# Patient Record
Sex: FEMALE | Race: WHITE | NOT HISPANIC OR LATINO | Employment: OTHER | URBAN - METROPOLITAN AREA
[De-identification: names, ages, dates, MRNs, and addresses within clinical notes are randomized per-mention and may not be internally consistent; named-entity substitution may affect disease eponyms.]

---

## 2023-04-12 ENCOUNTER — OFFICE VISIT (OUTPATIENT)
Dept: PRIMARY CARE | Facility: CLINIC | Age: 88
End: 2023-04-12
Payer: MEDICARE

## 2023-04-12 VITALS
DIASTOLIC BLOOD PRESSURE: 76 MMHG | HEART RATE: 80 BPM | TEMPERATURE: 97.5 F | SYSTOLIC BLOOD PRESSURE: 128 MMHG | BODY MASS INDEX: 25.13 KG/M2 | RESPIRATION RATE: 22 BRPM | WEIGHT: 128 LBS | HEIGHT: 60 IN

## 2023-04-12 DIAGNOSIS — N18.31 STAGE 3A CHRONIC KIDNEY DISEASE (MULTI): ICD-10-CM

## 2023-04-12 DIAGNOSIS — F32.0 DEPRESSION, MAJOR, SINGLE EPISODE, MILD (CMS-HCC): ICD-10-CM

## 2023-04-12 DIAGNOSIS — Z00.00 ROUTINE GENERAL MEDICAL EXAMINATION AT A HEALTH CARE FACILITY: ICD-10-CM

## 2023-04-12 DIAGNOSIS — L89.152 PRESSURE INJURY OF SACRAL REGION, STAGE 2 (MULTI): ICD-10-CM

## 2023-04-12 DIAGNOSIS — G47.00 INSOMNIA, UNSPECIFIED TYPE: ICD-10-CM

## 2023-04-12 DIAGNOSIS — G62.89 OTHER POLYNEUROPATHY: Primary | ICD-10-CM

## 2023-04-12 DIAGNOSIS — C50.919 MALIGNANT NEOPLASM OF FEMALE BREAST, UNSPECIFIED ESTROGEN RECEPTOR STATUS, UNSPECIFIED LATERALITY, UNSPECIFIED SITE OF BREAST (MULTI): ICD-10-CM

## 2023-04-12 DIAGNOSIS — J44.9 CHRONIC OBSTRUCTIVE PULMONARY DISEASE, UNSPECIFIED COPD TYPE (MULTI): ICD-10-CM

## 2023-04-12 PROBLEM — C44.91 BASAL CELL CARCINOMA: Status: ACTIVE | Noted: 2023-04-12

## 2023-04-12 PROBLEM — E78.5 HYPERLIPIDEMIA: Status: ACTIVE | Noted: 2023-04-12

## 2023-04-12 PROBLEM — K40.90 INGUINAL HERNIA: Status: ACTIVE | Noted: 2023-04-12

## 2023-04-12 PROBLEM — J45.909 ASTHMA (HHS-HCC): Status: ACTIVE | Noted: 2023-04-12

## 2023-04-12 PROBLEM — M85.80 OSTEOPENIA: Status: ACTIVE | Noted: 2023-04-12

## 2023-04-12 PROBLEM — D64.9 ANEMIA: Status: ACTIVE | Noted: 2023-04-12

## 2023-04-12 PROBLEM — G62.9 PERIPHERAL NEUROPATHY: Status: ACTIVE | Noted: 2023-04-12

## 2023-04-12 PROBLEM — C43.9 MELANOMA (MULTI): Status: ACTIVE | Noted: 2023-04-12

## 2023-04-12 PROBLEM — I10 HYPERTENSION: Status: ACTIVE | Noted: 2023-04-12

## 2023-04-12 PROBLEM — E55.9 VITAMIN D DEFICIENCY: Status: ACTIVE | Noted: 2023-04-12

## 2023-04-12 PROBLEM — K58.9 IRRITABLE BOWEL SYNDROME: Status: ACTIVE | Noted: 2023-04-12

## 2023-04-12 PROBLEM — E73.9 LACTOSE INTOLERANCE: Status: ACTIVE | Noted: 2023-04-12

## 2023-04-12 PROBLEM — I51.7 CARDIOMEGALY: Status: ACTIVE | Noted: 2023-04-12

## 2023-04-12 PROBLEM — R33.9 URINARY RETENTION: Status: ACTIVE | Noted: 2023-04-12

## 2023-04-12 PROBLEM — G60.9 IDIOPATHIC NEUROPATHY: Status: ACTIVE | Noted: 2023-04-12

## 2023-04-12 PROBLEM — S32.9XXA FRACTURE OF PELVIS (MULTI): Status: ACTIVE | Noted: 2023-04-12

## 2023-04-12 PROCEDURE — 99213 OFFICE O/P EST LOW 20 MIN: CPT | Performed by: FAMILY MEDICINE

## 2023-04-12 PROCEDURE — 1159F MED LIST DOCD IN RCRD: CPT | Performed by: FAMILY MEDICINE

## 2023-04-12 PROCEDURE — 1157F ADVNC CARE PLAN IN RCRD: CPT | Performed by: FAMILY MEDICINE

## 2023-04-12 PROCEDURE — 1036F TOBACCO NON-USER: CPT | Performed by: FAMILY MEDICINE

## 2023-04-12 PROCEDURE — 3074F SYST BP LT 130 MM HG: CPT | Performed by: FAMILY MEDICINE

## 2023-04-12 PROCEDURE — 3078F DIAST BP <80 MM HG: CPT | Performed by: FAMILY MEDICINE

## 2023-04-12 RX ORDER — FUROSEMIDE 20 MG/1
1 TABLET ORAL DAILY
COMMUNITY
Start: 2022-06-13 | End: 2023-10-17 | Stop reason: ALTCHOICE

## 2023-04-12 RX ORDER — DOCUSATE SODIUM 100 MG/1
CAPSULE, LIQUID FILLED ORAL
COMMUNITY
End: 2023-10-17 | Stop reason: ALTCHOICE

## 2023-04-12 RX ORDER — CYANOCOBALAMIN (VITAMIN B-12) 500 MCG
TABLET ORAL
COMMUNITY
End: 2023-10-17 | Stop reason: ALTCHOICE

## 2023-04-12 RX ORDER — ALBUTEROL SULFATE 90 UG/1
2 AEROSOL, METERED RESPIRATORY (INHALATION) EVERY 4 HOURS PRN
COMMUNITY
Start: 2021-03-22

## 2023-04-12 RX ORDER — IPRATROPIUM BROMIDE AND ALBUTEROL SULFATE 2.5; .5 MG/3ML; MG/3ML
3 SOLUTION RESPIRATORY (INHALATION)
COMMUNITY
Start: 2022-02-11 | End: 2023-10-17 | Stop reason: ALTCHOICE

## 2023-04-12 RX ORDER — FLUTICASONE FUROATE, UMECLIDINIUM BROMIDE AND VILANTEROL TRIFENATATE 100; 62.5; 25 UG/1; UG/1; UG/1
1 POWDER RESPIRATORY (INHALATION)
COMMUNITY
Start: 2022-10-31 | End: 2023-10-17 | Stop reason: ALTCHOICE

## 2023-04-12 RX ORDER — CHOLECALCIFEROL (VITAMIN D3) 50 MCG
TABLET ORAL
COMMUNITY
End: 2023-10-17 | Stop reason: ALTCHOICE

## 2023-04-12 RX ORDER — CHOLECALCIFEROL (VITAMIN D3) 125 MCG
CAPSULE ORAL
COMMUNITY
End: 2023-10-17 | Stop reason: ALTCHOICE

## 2023-04-12 RX ORDER — BUDESONIDE 90 UG/1
1 AEROSOL, POWDER RESPIRATORY (INHALATION)
COMMUNITY
Start: 2019-02-26 | End: 2023-04-12

## 2023-04-12 RX ORDER — TRAZODONE HYDROCHLORIDE 50 MG/1
100 TABLET ORAL NIGHTLY
COMMUNITY
Start: 2022-12-16 | End: 2023-08-04 | Stop reason: SDUPTHER

## 2023-04-12 RX ORDER — FLUTICASONE PROPIONATE 50 MCG
2 SPRAY, SUSPENSION (ML) NASAL DAILY
COMMUNITY
Start: 2019-02-26 | End: 2023-10-17 | Stop reason: ALTCHOICE

## 2023-04-12 RX ORDER — POLYETHYLENE GLYCOL 3350 17 G/17G
POWDER, FOR SOLUTION ORAL
COMMUNITY

## 2023-04-12 RX ORDER — GABAPENTIN 300 MG/1
1 CAPSULE ORAL NIGHTLY
COMMUNITY
Start: 2021-06-29 | End: 2023-06-20 | Stop reason: SDUPTHER

## 2023-04-12 ASSESSMENT — PATIENT HEALTH QUESTIONNAIRE - PHQ9
SUM OF ALL RESPONSES TO PHQ9 QUESTIONS 1 & 2: 0
2. FEELING DOWN, DEPRESSED OR HOPELESS: NOT AT ALL
1. LITTLE INTEREST OR PLEASURE IN DOING THINGS: NOT AT ALL

## 2023-04-12 ASSESSMENT — ENCOUNTER SYMPTOMS: INSOMNIA: 1

## 2023-04-12 NOTE — PATIENT INSTRUCTIONS
What can I do to help myself get better sleep?        Keep in mind that you may need less sleep as you age. Some people need only 5 to 6 hours of sleep a night, but most people do better with 7 to 8 hours. Sleep usually occurs in 3-hour cycles, so it is important to get at least 3 uninterrupted hours of sleep.    These tips can help you develop better sleep habits:    1)   Go to sleep only when you feel tired.     2)   Avoid reading, watching TV or worrying in bed. These can cause your body and brain to associate your bed with these activities, rather than with sleep.     3)   Develop a bedtime routine. Do the same thing every night before going to sleep. For example, take a warm bath and then read for 10 minutes every night before going to bed. Soon you'll connect these activities with sleeping, and doing them will help make you sleepy.     4)   Use the bedroom only for sleep and sexual activity.   If you can't fall asleep after 15 minutes, go to another room and return to your bed only when you feel tired. You may repeat this as often as needed during the night.     5)   Go to sleep and wake up at the same times each day, even on weekends. This helps your body develop a sleep schedule.     6)   Avoid or limit napping, because it can disturb your normal sleep rhythm. If you must take a nap, only rest for 30 minutes and don't nap after 3:00 p.m.     7)   Avoid caffeine from coffee and soft drinks, and nicotine from cigarettes, especially late in the day.     8)   Avoid eating large meals or drinking a lot of water in the evening.     9)   Keep your bedroom at a comfortable temperature and as dark as possible.   Make sure your bedroom is quiet and dark. If noise is a problem, use a fan to mask the noise or use ear plugs. If you must sleep during the day, hang dark blinds over the windows or wear an eye mask.     10)   Try eating a light snack before going to bed, but don't eat too much right before bedtime.  A glass of warm milk or some cheese and crackers may be all you need.     11)   Exercise regularly, but don't exercise within a few hours before going to bed.     12)   Set aside some time to relax before going to bed. For example, spend 30 minutes after dinner writing down what's worrying you and what you can do about it.  Another good way to relax is to focus on your breathing by taking slow, deep breaths while counting to 5. Then listen to the sound of your breath as you breathe out. You can also try to tighten and relax the muscle groups in your body, beginning at your feet and ending with your face muscles. A trained therapist can teach you other ways to relax. Relaxation CDs or tapes may also help you relax.

## 2023-04-12 NOTE — PROGRESS NOTES
Subjective   Patient ID: Lyn Arellano is a 89 y.o. female who presents for Toe Pain (Check area on inside left small toe, pain full ).    Having problems falling asleep   go to bed at 9 and gets up at 8 and wakes up 2 and 4  Declines labs     Insomnia  This is a chronic problem.        Review of Systems   Psychiatric/Behavioral:  The patient has insomnia.        Objective   /76 (BP Location: Right arm, Patient Position: Sitting, BP Cuff Size: Adult)   Pulse 80   Temp 36.4 °C (97.5 °F) (Temporal)   Resp 22   Ht 1.524 m (5')   Wt 58.1 kg (128 lb)   BMI 25.00 kg/m²     Physical Exam  Vitals and nursing note reviewed.   Constitutional:       General: She is not in acute distress.     Appearance: She is not ill-appearing.   HENT:      Head: Normocephalic and atraumatic.      Mouth/Throat:      Mouth: Mucous membranes are moist.   Eyes:      Conjunctiva/sclera: Conjunctivae normal.   Cardiovascular:      Rate and Rhythm: Normal rate and regular rhythm.      Heart sounds: Normal heart sounds.   Pulmonary:      Effort: Pulmonary effort is normal.      Breath sounds: Normal breath sounds.   Skin:     General: Skin is warm.   Neurological:      Mental Status: She is alert.   Psychiatric:         Mood and Affect: Mood normal.         Thought Content: Thought content normal.         Judgment: Judgment normal.         Assessment/Plan   Problem List Items Addressed This Visit          Nervous    Peripheral neuropathy - Primary    Insomnia     Discussed sleep hygiene and that she does not need 11 hours of sleep so needs to limit sleep to 8 hours             Respiratory    Chronic obstructive pulmonary disease (CMS/HCC)     Stable and will monitor at least yearly.   Seeing pulmonologist next week            Genitourinary    Stage 3a chronic kidney disease       Other    Breast cancer (CMS/HCC)     Doing well in remission         Pressure injury of sacral region, stage 2 (CMS/HCC)     healed          Other Visit  Diagnoses       Routine general medical examination at a health care facility        Relevant Orders    Follow Up In [facility] - PCP

## 2023-04-12 NOTE — ASSESSMENT & PLAN NOTE
Discussed sleep hygiene and that she does not need 11 hours of sleep so needs to limit sleep to 8 hours

## 2023-06-20 DIAGNOSIS — G62.9 NEUROPATHY: ICD-10-CM

## 2023-06-20 RX ORDER — GABAPENTIN 300 MG/1
300 CAPSULE ORAL NIGHTLY
Qty: 90 CAPSULE | Refills: 1 | Status: SHIPPED | OUTPATIENT
Start: 2023-06-20 | End: 2023-11-07

## 2023-06-26 ENCOUNTER — TELEPHONE (OUTPATIENT)
Dept: PRIMARY CARE | Facility: CLINIC | Age: 88
End: 2023-06-26
Payer: MEDICARE

## 2023-07-10 ENCOUNTER — HOSPITAL ENCOUNTER (OUTPATIENT)
Dept: DATA CONVERSION | Facility: HOSPITAL | Age: 88
End: 2023-07-10

## 2023-08-04 ENCOUNTER — TELEPHONE (OUTPATIENT)
Dept: PRIMARY CARE | Facility: CLINIC | Age: 88
End: 2023-08-04
Payer: MEDICARE

## 2023-08-04 DIAGNOSIS — G62.9 NEUROPATHY: ICD-10-CM

## 2023-08-04 DIAGNOSIS — G47.00 INSOMNIA, UNSPECIFIED TYPE: ICD-10-CM

## 2023-08-04 RX ORDER — TRAZODONE HYDROCHLORIDE 50 MG/1
50 TABLET ORAL NIGHTLY
Qty: 90 TABLET | Refills: 0 | Status: SHIPPED | OUTPATIENT
Start: 2023-08-04 | End: 2023-10-17

## 2023-10-17 ENCOUNTER — OFFICE VISIT (OUTPATIENT)
Dept: PRIMARY CARE | Facility: CLINIC | Age: 88
End: 2023-10-17
Payer: MEDICARE

## 2023-10-17 VITALS
RESPIRATION RATE: 18 BRPM | HEART RATE: 80 BPM | WEIGHT: 123 LBS | BODY MASS INDEX: 21.79 KG/M2 | HEIGHT: 63 IN | TEMPERATURE: 97.3 F | DIASTOLIC BLOOD PRESSURE: 76 MMHG | SYSTOLIC BLOOD PRESSURE: 124 MMHG

## 2023-10-17 DIAGNOSIS — Z23 IMMUNIZATION DUE: ICD-10-CM

## 2023-10-17 DIAGNOSIS — J44.9 CHRONIC OBSTRUCTIVE PULMONARY DISEASE, UNSPECIFIED COPD TYPE (MULTI): ICD-10-CM

## 2023-10-17 DIAGNOSIS — Z00.00 ROUTINE GENERAL MEDICAL EXAMINATION AT A HEALTH CARE FACILITY: ICD-10-CM

## 2023-10-17 DIAGNOSIS — Z00.00 ROUTINE GENERAL MEDICAL EXAMINATION AT HEALTH CARE FACILITY: Primary | ICD-10-CM

## 2023-10-17 PROCEDURE — 90662 IIV NO PRSV INCREASED AG IM: CPT | Performed by: FAMILY MEDICINE

## 2023-10-17 PROCEDURE — 1170F FXNL STATUS ASSESSED: CPT | Performed by: FAMILY MEDICINE

## 2023-10-17 PROCEDURE — 1036F TOBACCO NON-USER: CPT | Performed by: FAMILY MEDICINE

## 2023-10-17 PROCEDURE — 1126F AMNT PAIN NOTED NONE PRSNT: CPT | Performed by: FAMILY MEDICINE

## 2023-10-17 PROCEDURE — 1159F MED LIST DOCD IN RCRD: CPT | Performed by: FAMILY MEDICINE

## 2023-10-17 PROCEDURE — G0008 ADMIN INFLUENZA VIRUS VAC: HCPCS | Performed by: FAMILY MEDICINE

## 2023-10-17 PROCEDURE — 1160F RVW MEDS BY RX/DR IN RCRD: CPT | Performed by: FAMILY MEDICINE

## 2023-10-17 PROCEDURE — 3074F SYST BP LT 130 MM HG: CPT | Performed by: FAMILY MEDICINE

## 2023-10-17 PROCEDURE — G0439 PPPS, SUBSEQ VISIT: HCPCS | Performed by: FAMILY MEDICINE

## 2023-10-17 PROCEDURE — 3078F DIAST BP <80 MM HG: CPT | Performed by: FAMILY MEDICINE

## 2023-10-17 RX ORDER — FLUTICASONE FUROATE, UMECLIDINIUM BROMIDE AND VILANTEROL TRIFENATATE 100; 62.5; 25 UG/1; UG/1; UG/1
1 POWDER RESPIRATORY (INHALATION) DAILY
Qty: 1 EACH | Refills: 3
Start: 2023-10-17

## 2023-10-17 ASSESSMENT — PATIENT HEALTH QUESTIONNAIRE - PHQ9
SUM OF ALL RESPONSES TO PHQ9 QUESTIONS 1 AND 2: 0
2. FEELING DOWN, DEPRESSED OR HOPELESS: NOT AT ALL
1. LITTLE INTEREST OR PLEASURE IN DOING THINGS: NOT AT ALL

## 2023-10-17 ASSESSMENT — ACTIVITIES OF DAILY LIVING (ADL)
DRESSING: NEEDS ASSISTANCE
TAKING_MEDICATION: INDEPENDENT
BATHING: NEEDS ASSISTANCE
GROCERY_SHOPPING: NEEDS ASSISTANCE
DOING_HOUSEWORK: NEEDS ASSISTANCE
MANAGING_FINANCES: NEEDS ASSISTANCE

## 2023-10-17 NOTE — PROGRESS NOTES
"Subjective   Reason for Visit: Lyn Arellano is an 89 y.o. female here for a Medicare Wellness visit.     Past Medical, Surgical, and Family History reviewed and updated in chart.    Reviewed all medications by prescribing practitioner or clinical pharmacist (such as prescriptions, OTCs, herbal therapies and supplements) and documented in the medical record.    HPI    Patient Care Team:  Melany Grewal MD as PCP - General (Family Medicine)  Melany Grewal MD as PCP - [facility]     Review of Systems    Objective   Vitals:  /76   Pulse 80   Temp 36.3 °C (97.3 °F)   Resp 18   Ht 1.6 m (5' 3\")   Wt 55.8 kg (123 lb)   BMI 21.79 kg/m²       Physical Exam  Vitals and nursing note reviewed.   Constitutional:       General: She is not in acute distress.     Appearance: She is not ill-appearing.   HENT:      Head: Normocephalic and atraumatic.      Mouth/Throat:      Mouth: Mucous membranes are moist.   Eyes:      Conjunctiva/sclera: Conjunctivae normal.   Cardiovascular:      Rate and Rhythm: Normal rate and regular rhythm.      Heart sounds: Normal heart sounds.   Pulmonary:      Effort: Pulmonary effort is normal.      Breath sounds: Normal breath sounds.   Skin:     General: Skin is warm.   Neurological:      Mental Status: She is alert.   Psychiatric:         Mood and Affect: Mood normal.         Thought Content: Thought content normal.         Judgment: Judgment normal.       Assessment/Plan   Problem List Items Addressed This Visit       Chronic obstructive pulmonary disease (CMS/HCC)    Overview     Formatting of this note might be different from the original. bronchiectesisDr Cannon Formatting of this note might be different from the original. bronchiectesis, Dr Cannon  She continues to be at baseline with some wheeze. We will continue her ICS and encourage the BID dosing         Relevant Medications    fluticasone-umeclidin-vilanter (Trelegy Ellipta) 100-62.5-25 mcg " blister with device    Other Relevant Orders    Follow Up In [facility] - PCP - Established     Other Visit Diagnoses       Routine general medical examination at health care facility    -  Primary    Routine general medical examination at a health care facility        Immunization due        Relevant Orders    Flu vaccine, high dose seasonal, preservative free

## 2024-01-09 ENCOUNTER — TELEPHONE (OUTPATIENT)
Dept: PRIMARY CARE | Facility: CLINIC | Age: 89
End: 2024-01-09
Payer: MEDICARE

## 2024-01-09 NOTE — TELEPHONE ENCOUNTER
Daughter called because Pt is in [facility] they need to fax over paperwork to authorize this change and are needing the paperwork back today according to Tabatha. Tabatha would like a call back if you have any question [phone].

## 2024-01-10 ENCOUNTER — TELEPHONE (OUTPATIENT)
Dept: PRIMARY CARE | Facility: CLINIC | Age: 89
End: 2024-01-10
Payer: MEDICARE

## 2024-01-10 NOTE — TELEPHONE ENCOUNTER
Amy needs the RX form completed or a copy of med list, diagnosis & allergies page faxed to her please. She did send a copy of the rx form to us today.

## 2024-01-12 ENCOUNTER — NURSING HOME VISIT (OUTPATIENT)
Dept: POST ACUTE CARE | Facility: EXTERNAL LOCATION | Age: 89
End: 2024-01-12
Payer: MEDICARE

## 2024-01-12 DIAGNOSIS — J44.9 CHRONIC OBSTRUCTIVE PULMONARY DISEASE, UNSPECIFIED COPD TYPE (MULTI): Primary | ICD-10-CM

## 2024-01-12 PROCEDURE — 99342 HOME/RES VST NEW LOW MDM 30: CPT | Performed by: INTERNAL MEDICINE

## 2024-01-12 NOTE — LETTER
Patient: Lyn Arellano  : 10/22/1925    Encounter Date: 2024    Admission note  89-year-old female who was living in independent living [address].  She apparently had an episode of possible exacerbation of COPD.  Her pulmonologist placed her on Augmentin.  This was started yesterday.  Because she was feeling a bit weak and from prior flu and now this she has been placed in an assisted living for a number of days to recuperate strength and to be monitored a little bit more closely as well as obtain a little bit more support.    Past medical history is remarkable for COPD, peripheral neuropathy, irritable bowel syndrome, hypertension, hyperlipidemia, melanoma, follows with fractured pelvis    Medications and orders reviewed.    Social history  Patient is  and lives alone in [facility] independent living.    Review of systems  Patient denies pain.  Patient denies postural dizziness.  There is occasional productive cough but patient states that this is stable in pattern and nothing new for her at the moment given her history of COPD.  Patient does have episodes of shortness of breath at times but again this is stable in pattern and nothing new.  No GI distress no nausea vomiting diarrhea or constipation  No dysuria or abdominal pain  No issues reported by staff.    Physical exam  Blood pressure is 122/68, temp is 97 8, pulse is 78, pulse ox is 97% on room air  HEENT is grossly unremarkable.  Patient is alert and orient x 3 no apparent distress although she appears to be somewhat thin and frail  Lungs demonstrate somewhat diminished breath sounds but no evidence of active wheezing or rhonchi  Heart rate and rhythm is regular with a 3/6 systolic ejection murmur  Abdomen is soft nontender  No peripheral edema.  Neurologically the patient appears grossly intact with no focal deficits or tremors.  Skin demonstrates scattered senile purpura on the lower extremities.  There are few dry scaly  patches on the legs and lower areas.  There are scattered seborrheic keratosis on the back    Assessment and care plan  History of COPD with recent flow and possibly subsequent bacterial pneumonia.  The patient is being treated by her pulmonologist with Augmentin.  We will complete the course of this and monitor her respiratory status.    The patient may only be here short term.  No baseline labs will be ordered at this time as they do not appear to be indicated for now.  We will reassess this if the patient decides to stay long-term      Electronically Signed By: Ranjeet Evans DO   1/12/24 12:52 PM

## 2024-01-12 NOTE — PROGRESS NOTES
Admission note  89-year-old female who was living in independent living [facility] apartments.  She apparently had an episode of possible exacerbation of COPD.  Her pulmonologist placed her on Augmentin.  This was started yesterday.  Because she was feeling a bit weak and from prior flu and now this she has been placed in an assisted living for a number of days to recuperate strength and to be monitored a little bit more closely as well as obtain a little bit more support.    Past medical history is remarkable for COPD, peripheral neuropathy, irritable bowel syndrome, hypertension, hyperlipidemia, melanoma, follows with fractured pelvis    Medications and orders reviewed.    Social history  Patient is  and lives alone in [facility] independent living.    Review of systems  Patient denies pain.  Patient denies postural dizziness.  There is occasional productive cough but patient states that this is stable in pattern and nothing new for her at the moment given her history of COPD.  Patient does have episodes of shortness of breath at times but again this is stable in pattern and nothing new.  No GI distress no nausea vomiting diarrhea or constipation  No dysuria or abdominal pain  No issues reported by staff.    Physical exam  Blood pressure is 122/68, temp is 97 8, pulse is 78, pulse ox is 97% on room air  HEENT is grossly unremarkable.  Patient is alert and orient x 3 no apparent distress although she appears to be somewhat thin and frail  Lungs demonstrate somewhat diminished breath sounds but no evidence of active wheezing or rhonchi  Heart rate and rhythm is regular with a 3/6 systolic ejection murmur  Abdomen is soft nontender  No peripheral edema.  Neurologically the patient appears grossly intact with no focal deficits or tremors.  Skin demonstrates scattered senile purpura on the lower extremities.  There are few dry scaly patches on the legs and lower areas.  There are scattered seborrheic keratosis  on the back    Assessment and care plan  History of COPD with recent flow and possibly subsequent bacterial pneumonia.  The patient is being treated by her pulmonologist with Augmentin.  We will complete the course of this and monitor her respiratory status.    The patient may only be here short term.  No baseline labs will be ordered at this time as they do not appear to be indicated for now.  We will reassess this if the patient decides to stay long-term

## 2024-01-16 PROCEDURE — G0180 MD CERTIFICATION HHA PATIENT: HCPCS | Performed by: INTERNAL MEDICINE

## 2024-03-15 ENCOUNTER — NURSING HOME VISIT (OUTPATIENT)
Dept: POST ACUTE CARE | Facility: EXTERNAL LOCATION | Age: 89
End: 2024-03-15
Payer: MEDICARE

## 2024-03-15 VITALS
WEIGHT: 120 LBS | DIASTOLIC BLOOD PRESSURE: 62 MMHG | RESPIRATION RATE: 16 BRPM | OXYGEN SATURATION: 96 % | TEMPERATURE: 97.7 F | BODY MASS INDEX: 21.26 KG/M2 | HEART RATE: 80 BPM | SYSTOLIC BLOOD PRESSURE: 122 MMHG

## 2024-03-15 DIAGNOSIS — I10 PRIMARY HYPERTENSION: ICD-10-CM

## 2024-03-15 DIAGNOSIS — E55.9 VITAMIN D DEFICIENCY: ICD-10-CM

## 2024-03-15 DIAGNOSIS — N18.31 STAGE 3A CHRONIC KIDNEY DISEASE (MULTI): ICD-10-CM
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
## 2024-03-15 PROCEDURE — 99348 HOME/RES VST EST LOW MDM 30: CPT | Performed by: NURSE PRACTITIONER

## 2024-03-15 ASSESSMENT — ENCOUNTER SYMPTOMS
COUGH: 0
DIARRHEA: 0
CONSTIPATION: 0
CHILLS: 0
FATIGUE: 0
VOMITING: 0
SHORTNESS OF BREATH: 0
DIFFICULTY URINATING: 0
PALPITATIONS: 0
FEVER: 0
NAUSEA: 0
ABDOMINAL PAIN: 0

## 2024-03-15 NOTE — PROGRESS NOTES
Subjective   Lyn Arellano is a 89 y.o. female Here to follow up on fall.   HPI She had a fall ambulating to the bathroom by herself, no apparent injury.   There are no family members present during time of visit.    she  denies any complaints when asked.  Eating and drinking well.   No concerns per staff.       Review of Systems   Constitutional:  Negative for chills, fatigue and fever.   Respiratory:  Negative for cough and shortness of breath.    Cardiovascular:  Negative for chest pain and palpitations.   Gastrointestinal:  Negative for abdominal pain, constipation, diarrhea, nausea and vomiting.   Genitourinary:  Negative for difficulty urinating.       Objective   /62   Pulse 80   Temp 36.5 °C (97.7 °F)   Resp 16   Wt 54.4 kg (120 lb)   SpO2 96%   BMI 21.26 kg/m²     Physical Exam  Constitutional:       General: She is not in acute distress.  HENT:      Head: Normocephalic and atraumatic.   Eyes:      Conjunctiva/sclera: Conjunctivae normal.   Cardiovascular:      Rate and Rhythm: Normal rate and regular rhythm.   Pulmonary:      Effort: Pulmonary effort is normal. No respiratory distress.      Breath sounds: Normal breath sounds.   Abdominal:      General: Bowel sounds are normal. There is no distension.      Palpations: Abdomen is soft.      Tenderness: There is no abdominal tenderness.   Musculoskeletal:         General: Normal range of motion.      Right lower leg: No edema.      Left lower leg: No edema.   Skin:     General: Skin is warm and dry.      Comments: Dressing to right anterior lower extremity clean dry and intact   Neurological:      General: No focal deficit present.      Mental Status: She is alert and oriented to person, place, and time.   Psychiatric:         Mood and Affect: Mood normal.         Behavior: Behavior normal.         Assessment/Plan   Problem List Items Addressed This Visit       Hypertension     Not currently on antihypertensive medications.  Continue to  monitor and initiate meds based on clinical course          Stage 3a chronic kidney disease (CMS/[facility])     monitor with routine labs.           Vitamin D deficiency     Not currently on supplement, monitor level with routine labs.          Fall - Primary     She had a fall ambulating to bathroom, no apparent injury.           labs/meds/orders reviewed  staff to monitor and notify for any changes.

## 2024-03-15 NOTE — ASSESSMENT & PLAN NOTE
Not currently on antihypertensive medications.  Continue to monitor and initiate meds based on clinical course

## 2024-03-15 NOTE — LETTER
Patient: Lyn Arellano  : 10/22/1925    Encounter Date: 03/15/2024    Subjective  Lyn Arellano is a 89 y.o. female Here to follow up on fall.   HPI She had a fall ambulating to the bathroom by herself, no apparent injury.   There are no family members present during time of visit.    she  denies any complaints when asked.  Eating and drinking well.   No concerns per staff.       Review of Systems   Constitutional:  Negative for chills, fatigue and fever.   Respiratory:  Negative for cough and shortness of breath.    Cardiovascular:  Negative for chest pain and palpitations.   Gastrointestinal:  Negative for abdominal pain, constipation, diarrhea, nausea and vomiting.   Genitourinary:  Negative for difficulty urinating.       Objective  /62   Pulse 80   Temp 36.5 °C (97.7 °F)   Resp 16   Wt 54.4 kg (120 lb)   SpO2 96%   BMI 21.26 kg/m²     Physical Exam  Constitutional:       General: She is not in acute distress.  HENT:      Head: Normocephalic and atraumatic.   Eyes:      Conjunctiva/sclera: Conjunctivae normal.   Cardiovascular:      Rate and Rhythm: Normal rate and regular rhythm.   Pulmonary:      Effort: Pulmonary effort is normal. No respiratory distress.      Breath sounds: Normal breath sounds.   Abdominal:      General: Bowel sounds are normal. There is no distension.      Palpations: Abdomen is soft.      Tenderness: There is no abdominal tenderness.   Musculoskeletal:         General: Normal range of motion.      Right lower leg: No edema.      Left lower leg: No edema.   Skin:     General: Skin is warm and dry.      Comments: Dressing to right anterior lower extremity clean dry and intact   Neurological:      General: No focal deficit present.      Mental Status: She is alert and oriented to person, place, and time.   Psychiatric:         Mood and Affect: Mood normal.         Behavior: Behavior normal.         Assessment/Plan  Problem List Items Addressed This Visit        Hypertension     Not currently on antihypertensive medications.  Continue to monitor and initiate meds based on clinical course          Stage 3a chronic kidney disease (CMS/[facility])     monitor with routine labs.           Vitamin D deficiency     Not currently on supplement, monitor level with routine labs.          Fall - Primary     She had a fall ambulating to bathroom, no apparent injury.           labs/meds/orders reviewed  staff to monitor and notify for any changes.      Electronically Signed By: RENEA Erazo   3/15/24  1:23 PM

## 2024-04-17 ENCOUNTER — APPOINTMENT (OUTPATIENT)
Dept: PRIMARY CARE | Facility: CLINIC | Age: 89
End: 2024-04-17
Payer: MEDICARE

## 2024-08-03 ENCOUNTER — NURSING HOME VISIT (OUTPATIENT)
Dept: POST ACUTE CARE | Facility: EXTERNAL LOCATION | Age: 89
End: 2024-08-03
Payer: MEDICARE

## 2024-08-03 DIAGNOSIS — D64.9 ANEMIA, UNSPECIFIED TYPE: Primary | ICD-10-CM

## 2024-08-03 DIAGNOSIS — G47.00 INSOMNIA, UNSPECIFIED TYPE: ICD-10-CM

## 2024-08-03 DIAGNOSIS — G60.9 IDIOPATHIC NEUROPATHY: ICD-10-CM

## 2024-08-03 DIAGNOSIS — J44.9 CHRONIC OBSTRUCTIVE PULMONARY DISEASE, UNSPECIFIED COPD TYPE (MULTI): ICD-10-CM

## 2024-08-03 NOTE — LETTER
Patient: Lyn Arellano  : 10/22/1925    Encounter Date: 2024    Follow-up visit  Patient seen today at her request due to having trouble sleeping.  She states on a routine basis she has trouble staying asleep and falling asleep.  She denies any significant symptoms at bedtime preventing her sleep.  She denies pain shortness of breath or GI issues    Medications and orders were reviewed.    Prior labs were reviewed and demonstrates anemia with a hemoglobin of 10.8 and 31.9 in .  BUN was slightly elevated at 28.    Physical exam  Blood pressure is 122/71, temp 97 5, pulse 70, pulse ox is 98% on room air  HEENT is grossly unremarkable the patient is no apparent distress she is pleasant and conversant  Lungs demonstrate some mid to end expiratory wheeze and rhonchi.  Heart rate and rhythm is regular.  Abdomen is soft nontender  No peripheral edema.  Neurologically no focal deficits or tremors    Assessment and care plan  The patient's COPD is relatively stable with no significant symptomatology.  Prior pulmonary notes were reviewed.  We will continue current medications.  Patient does have evidence of anemia.  We will recheck a BMP along with a CBC folate iron panel B12 and also Hemoccult stools x 2.  We will also check a TSH.  In addition to the above labs, regarding her insomnia we will review the above labs but also add trazodone 50 mg at bedtime for sleep and monitor      Electronically Signed By: Ranjeet Evans DO   8/3/24 11:30 AM

## 2024-08-03 NOTE — PROGRESS NOTES
Follow-up visit  Patient seen today at her request due to having trouble sleeping.  She states on a routine basis she has trouble staying asleep and falling asleep.  She denies any significant symptoms at bedtime preventing her sleep.  She denies pain shortness of breath or GI issues    Medications and orders were reviewed.    Prior labs were reviewed and demonstrates anemia with a hemoglobin of 10.8 and 31.9 in March 24.  BUN was slightly elevated at 28.    Physical exam  Blood pressure is 122/71, temp 97 5, pulse 70, pulse ox is 98% on room air  HEENT is grossly unremarkable the patient is no apparent distress she is pleasant and conversant  Lungs demonstrate some mid to end expiratory wheeze and rhonchi.  Heart rate and rhythm is regular.  Abdomen is soft nontender  No peripheral edema.  Neurologically no focal deficits or tremors    Assessment and care plan  The patient's COPD is relatively stable with no significant symptomatology.  Prior pulmonary notes were reviewed.  We will continue current medications.  Patient does have evidence of anemia.  We will recheck a BMP along with a CBC folate iron panel B12 and also Hemoccult stools x 2.  We will also check a TSH.  In addition to the above labs, regarding her insomnia we will review the above labs but also add trazodone 50 mg at bedtime for sleep and monitor

## 2024-09-18 ENCOUNTER — POST MORTEM DOCUMENTATION (OUTPATIENT)
Dept: PRIMARY CARE | Facility: CLINIC | Age: 89
End: 2024-09-18
Payer: MEDICARE

## 2024-09-18 NOTE — PROGRESS NOTES
Hello! This patient passed away on 24 I came across her chart while doing the workque. It needs to be marked  . Thank you!  Chart marked  in error.   Email from Dr. Ranjeet Evans  Patient listed as decreased in Epic.  Please correct.  She is alive and well at assisted living under my care

## 2024-11-19 ENCOUNTER — NURSING HOME VISIT (OUTPATIENT)
Dept: POST ACUTE CARE | Facility: EXTERNAL LOCATION | Age: 89
End: 2024-11-19
Payer: MEDICARE

## 2024-11-19 DIAGNOSIS — J44.9 CHRONIC OBSTRUCTIVE PULMONARY DISEASE, UNSPECIFIED COPD TYPE (MULTI): Primary | ICD-10-CM

## 2024-11-19 DIAGNOSIS — G47.00 INSOMNIA, UNSPECIFIED TYPE: ICD-10-CM

## 2024-11-19 DIAGNOSIS — M15.0 PRIMARY OSTEOARTHRITIS INVOLVING MULTIPLE JOINTS: ICD-10-CM

## 2024-11-19 PROCEDURE — 99348 HOME/RES VST EST LOW MDM 30: CPT | Performed by: INTERNAL MEDICINE

## 2024-11-19 NOTE — LETTER
Patient: Lyn Arellano  : 10/22/1925    Encounter Date: 2024    Follow-up visit  POA asked that we prescribed Advil on a as needed basis.  When discussing this with the patient she was unclear since she has no significant pain other than some discomfort in her left hip with flexion and moving about.  She also states she has trouble sleeping and Tylenol or Advil helps her sleep more so than the trazodone.    Medications and orders reviewed.  We discussed chronic use of Advil and our concern for potential side effects on her stomach and kidneys and I would prefer the use of Tylenol if needed    Physical exam  Vital signs are stable patient is afebrile  Lungs are clear heart rate and rhythm is regular with a 3/6 systolic ejection murmur  Abdomen soft nontender there is no significant peripheral edema.    Assessment and care plan  Patient seems to be having more trouble with insomnia.  Since the Tylenol seems to help her in addition to the trazodone we will add Tylenol 500 mg at bedtime.  She can also have this every 8 hours as needed for any pain or discomfort.  We have advised against the use of regular Advil however we will allow Advil on an as-needed basis 200 mg once daily as needed for pain.  We will continue to monitor the patient's issues with sleep or arthritic pain      Electronically Signed By: Ranjeet Evans DO   24  1:06 PM

## 2024-11-19 NOTE — PROGRESS NOTES
Follow-up visit  POA asked that we prescribed Advil on a as needed basis.  When discussing this with the patient she was unclear since she has no significant pain other than some discomfort in her left hip with flexion and moving about.  She also states she has trouble sleeping and Tylenol or Advil helps her sleep more so than the trazodone.    Medications and orders reviewed.  We discussed chronic use of Advil and our concern for potential side effects on her stomach and kidneys and I would prefer the use of Tylenol if needed    Physical exam  Vital signs are stable patient is afebrile  Lungs are clear heart rate and rhythm is regular with a 3/6 systolic ejection murmur  Abdomen soft nontender there is no significant peripheral edema.    Assessment and care plan  Patient seems to be having more trouble with insomnia.  Since the Tylenol seems to help her in addition to the trazodone we will add Tylenol 500 mg at bedtime.  She can also have this every 8 hours as needed for any pain or discomfort.  We have advised against the use of regular Advil however we will allow Advil on an as-needed basis 200 mg once daily as needed for pain.  We will continue to monitor the patient's issues with sleep or arthritic pain

## 2025-01-03 PROCEDURE — G0180 MD CERTIFICATION HHA PATIENT: HCPCS | Performed by: INTERNAL MEDICINE

## 2025-01-13 ENCOUNTER — NURSING HOME VISIT (OUTPATIENT)
Dept: POST ACUTE CARE | Facility: EXTERNAL LOCATION | Age: OVER 89
End: 2025-01-13
Payer: MEDICARE

## 2025-01-13 DIAGNOSIS — S81.801D WOUND OF RIGHT LOWER EXTREMITY, SUBSEQUENT ENCOUNTER: Primary | ICD-10-CM

## 2025-01-13 PROCEDURE — 99348 HOME/RES VST EST LOW MDM 30: CPT | Performed by: INTERNAL MEDICINE

## 2025-01-13 NOTE — PROGRESS NOTES
Follow-up visit  Patient is seen today in follow-up to a fall recently with skin tear type wounds to her anterior left shin area.  Medications and orders were reviewed.  Review of systems  The patient does have some discomfort particular dressing changes.  There is been no fever chills.  No reported increase in swelling or foul or purulent discharge from the leg.    Physical exam  Blood pressure is 128/82.  Her temps 97 4 she has remained afebrile pulse is 74 pulse ox is 94% on room air  She is alert oriented pleasant no apparent distress.  Left leg demonstrates at least 2 full-thickness skin tear 1 approximately 3 cm x 5 cm the other 1 approximately 2 x 3 cm.  There is base granulation tissue no sign of Necrotic tissue or purulent discharge.  No surrounding redness.  There are surrounding areas of contusion from the fall    Assessment and care plan  Full-thickness type skin tear wounds from a recent fall.  No sign of infection.  We have requested the wound care team to assess and manage this with frequent dressing changes and monitoring

## 2025-01-13 NOTE — LETTER
Patient: Lyn Arellano  : 10/22/1925    Encounter Date: 2025    Follow-up visit  Patient is seen today in follow-up to a fall recently with skin tear type wounds to her anterior left shin area.  Medications and orders were reviewed.  Review of systems  The patient does have some discomfort particular dressing changes.  There is been no fever chills.  No reported increase in swelling or foul or purulent discharge from the leg.    Physical exam  Blood pressure is 128/82.  Her temps 97 4 she has remained afebrile pulse is 74 pulse ox is 94% on room air  She is alert oriented pleasant no apparent distress.  Left leg demonstrates at least 2 full-thickness skin tear 1 approximately 3 cm x 5 cm the other 1 approximately 2 x 3 cm.  There is base granulation tissue no sign of Necrotic tissue or purulent discharge.  No surrounding redness.  There are surrounding areas of contusion from the fall    Assessment and care plan  Full-thickness type skin tear wounds from a recent fall.  No sign of infection.  We have requested the wound care team to assess and manage this with frequent dressing changes and monitoring      Electronically Signed By: Ranjeet Evans DO   25 11:50 AM

## 2025-02-06 DIAGNOSIS — L89.152 PRESSURE INJURY OF SACRAL REGION, STAGE 2 (MULTI): Primary | ICD-10-CM

## 2025-02-06 DIAGNOSIS — S32.9XXS CLOSED NONDISPLACED FRACTURE OF PELVIS, UNSPECIFIED PART OF PELVIS, SEQUELA: ICD-10-CM

## 2025-02-12 RX ORDER — GABAPENTIN 300 MG/1
CAPSULE ORAL
Qty: 30 CAPSULE | Refills: 3 | Status: SHIPPED | OUTPATIENT
Start: 2025-02-12

## 2025-03-07 ENCOUNTER — NURSING HOME VISIT (OUTPATIENT)
Dept: POST ACUTE CARE | Facility: EXTERNAL LOCATION | Age: OVER 89
End: 2025-03-07
Payer: MEDICARE

## 2025-03-07 DIAGNOSIS — J44.9 CHRONIC OBSTRUCTIVE PULMONARY DISEASE, UNSPECIFIED COPD TYPE (MULTI): Primary | ICD-10-CM

## 2025-03-07 DIAGNOSIS — J18.9 COMMUNITY ACQUIRED PNEUMONIA, UNSPECIFIED LATERALITY: ICD-10-CM

## 2025-03-07 PROCEDURE — 99348 HOME/RES VST EST LOW MDM 30: CPT | Performed by: INTERNAL MEDICINE

## 2025-03-07 NOTE — LETTER
Patient: Lyn Arellano  : 10/22/1925    Encounter Date: 2025    Follow-up visit  Patient seen today in follow-up to her recent worsening of cough some malaise and reported chest pain.  We had originally advised the patient go to the hospital because of concern about the chest pain and weakness and shortness of breath however she refused.  Chest x-ray was done which demonstrated bilateral patchy infiltrates suggestive of possible pneumonia.  Because of the cough and change in overall status and malaise we started the patient on Z-Max along with Keflex 500 mg twice a day for 5 days.  Currently she feels about the same no worse however she has only been on the antibiotics for about 24 to 48 hours.    Medications and orders are reviewed.    Review of systems  Patient denies any headache fever chills.  She still feels a bit weak but no worse  No GI symptoms no nausea vomiting diarrhea  No dysuria.  She still has somewhat of a cough which is nonproductive for the most part    Physical exam  Blood pressure is 125/71, temps 97.6, pulse is 60, pulse ox is 97% on room air  HEENT is grossly unremarkable she is in no apparent distress appears a bit weak and frail but otherwise stable  Lungs are clear at this time.  Heart rate and rhythm is regular with a 3/6 systolic ejection murmur  No peripheral edema    Assessment and care plan  Probable community-acquired pneumonia in the setting of an elderly frail lady with COPD.  She is completing a course of antibiotics consisting of Zithromax and Keflex and appears to be stable at this time.  We will continue current regimen and monitor      Electronically Signed By: Ranjeet Evans DO   3/7/25 11:31 AM

## 2025-03-07 NOTE — PROGRESS NOTES
Follow-up visit  Patient seen today in follow-up to her recent worsening of cough some malaise and reported chest pain.  We had originally advised the patient go to the hospital because of concern about the chest pain and weakness and shortness of breath however she refused.  Chest x-ray was done which demonstrated bilateral patchy infiltrates suggestive of possible pneumonia.  Because of the cough and change in overall status and malaise we started the patient on Z-Max along with Keflex 500 mg twice a day for 5 days.  Currently she feels about the same no worse however she has only been on the antibiotics for about 24 to 48 hours.    Medications and orders are reviewed.    Review of systems  Patient denies any headache fever chills.  She still feels a bit weak but no worse  No GI symptoms no nausea vomiting diarrhea  No dysuria.  She still has somewhat of a cough which is nonproductive for the most part    Physical exam  Blood pressure is 125/71, temps 97.6, pulse is 60, pulse ox is 97% on room air  HEENT is grossly unremarkable she is in no apparent distress appears a bit weak and frail but otherwise stable  Lungs are clear at this time.  Heart rate and rhythm is regular with a 3/6 systolic ejection murmur  No peripheral edema    Assessment and care plan  Probable community-acquired pneumonia in the setting of an elderly frail lady with COPD.  She is completing a course of antibiotics consisting of Zithromax and Keflex and appears to be stable at this time.  We will continue current regimen and monitor

## 2025-03-18 ENCOUNTER — NURSING HOME VISIT (OUTPATIENT)
Dept: POST ACUTE CARE | Facility: EXTERNAL LOCATION | Age: OVER 89
End: 2025-03-18
Payer: MEDICARE

## 2025-03-18 DIAGNOSIS — J44.9 CHRONIC OBSTRUCTIVE PULMONARY DISEASE, UNSPECIFIED COPD TYPE (MULTI): Primary | ICD-10-CM

## 2025-03-18 DIAGNOSIS — G47.00 INSOMNIA, UNSPECIFIED TYPE: ICD-10-CM

## 2025-03-18 DIAGNOSIS — J18.9 COMMUNITY ACQUIRED PNEUMONIA, UNSPECIFIED LATERALITY: ICD-10-CM

## 2025-03-18 PROCEDURE — 99348 HOME/RES VST EST LOW MDM 30: CPT | Performed by: INTERNAL MEDICINE

## 2025-03-18 NOTE — PROGRESS NOTES
Follow-up visit  Patient seen today in follow-up to her recent presumed community-acquired pneumonia.  Patient still has a cough which is no different than her usual cough.  She denies any fever chills or malaise.  Her shortness of breath is at baseline and nothing different there.  Her cough is nonproductive.  Occasionally it causes her chest to hurt.    Patient also reports that she still has trouble sleeping this has been a chronic problem the trazodone is not helping and is willing to try something different    Medications and orders are reviewed.  Prior chest x-ray was reviewed which was done approximately 10 to 14 days ago    Physical exam  Vital signs are stable patient is afebrile temp is 97.6 as per pulse ox is 97% on room air  HEENT is grossly unremarkable.  Lungs demonstrate some right sided crackles and slight rhonchi.  Heart rate and rhythm is regular with a 3/6 systolic ejection murmur.  No peripheral edema    Assessment and care plan  Slowly resolving at community-acquired pneumonia acute bronchitis with some right sided abnormal lung sounds.  We will repeat a chest x-ray.  Since the patient is otherwise stable we will hold off on starting additional antibiotics at this time since she has just recently completed a course.  We will however provide Mucinex DM twice a day as needed for cough    As far as the insomnia is concerned we will discontinue the trazodone since it does not seem to be helping and try her on melatonin 5 mg at bedtime.  At all possible we are trying to avoid benzodiazepine type sleeping aids

## 2025-03-18 NOTE — LETTER
Patient: Lyn Arellano  : 10/22/1925    Encounter Date: 2025    Follow-up visit  Patient seen today in follow-up to her recent presumed community-acquired pneumonia.  Patient still has a cough which is no different than her usual cough.  She denies any fever chills or malaise.  Her shortness of breath is at baseline and nothing different there.  Her cough is nonproductive.  Occasionally it causes her chest to hurt.    Patient also reports that she still has trouble sleeping this has been a chronic problem the trazodone is not helping and is willing to try something different    Medications and orders are reviewed.  Prior chest x-ray was reviewed which was done approximately 10 to 14 days ago    Physical exam  Vital signs are stable patient is afebrile temp is 97.6 as per pulse ox is 97% on room air  HEENT is grossly unremarkable.  Lungs demonstrate some right sided crackles and slight rhonchi.  Heart rate and rhythm is regular with a 3/6 systolic ejection murmur.  No peripheral edema    Assessment and care plan  Slowly resolving at community-acquired pneumonia acute bronchitis with some right sided abnormal lung sounds.  We will repeat a chest x-ray.  Since the patient is otherwise stable we will hold off on starting additional antibiotics at this time since she has just recently completed a course.  We will however provide Mucinex DM twice a day as needed for cough    As far as the insomnia is concerned we will discontinue the trazodone since it does not seem to be helping and try her on melatonin 5 mg at bedtime.  At all possible we are trying to avoid benzodiazepine type sleeping aids      Electronically Signed By: Ranjeet Evans DO   3/18/25 10:28 AM

## 2025-04-08 ENCOUNTER — NURSING HOME VISIT (OUTPATIENT)
Dept: POST ACUTE CARE | Facility: EXTERNAL LOCATION | Age: OVER 89
End: 2025-04-08
Payer: MEDICARE

## 2025-04-08 DIAGNOSIS — E78.5 HYPERLIPIDEMIA, UNSPECIFIED HYPERLIPIDEMIA TYPE: Primary | ICD-10-CM

## 2025-04-08 DIAGNOSIS — I10 PRIMARY HYPERTENSION: ICD-10-CM

## 2025-04-08 DIAGNOSIS — J44.9 CHRONIC OBSTRUCTIVE PULMONARY DISEASE, UNSPECIFIED COPD TYPE (MULTI): ICD-10-CM

## 2025-04-08 DIAGNOSIS — G62.89 OTHER POLYNEUROPATHY: ICD-10-CM

## 2025-04-08 PROCEDURE — 99342 HOME/RES VST NEW LOW MDM 30: CPT | Performed by: STUDENT IN AN ORGANIZED HEALTH CARE EDUCATION/TRAINING PROGRAM

## 2025-04-08 NOTE — LETTER
Patient: Lyn Arellano  : 10/22/1925    Encounter Date: 2025    Subjective  Patient ID: Lyn Arellano is a 89 y.o. female.    Patient is a 89-year-old female with past medical history of COPD, neuropathy, emphysema who resides at assisted living facility.  She regards that she is overall doing well without any acute issues, is chronically short of breath, but tries to stay active within the facility.  Otherwise, her breathing status is overall stable, is compliant with her inhalers and neuropathy is overall stable as well.  Otherwise, endorses no acute issues or concerns and overall feels well.         Review of Systems   Constitutional: Negative.    HENT: Negative.     Respiratory: Negative.     Cardiovascular: Negative.    Gastrointestinal: Negative.    Musculoskeletal: Negative.    Neurological: Negative.    Psychiatric/Behavioral: Negative.         ObjectiveVitals Reviewed via facility EMR    Physical Exam  Constitutional:       General: She is not in acute distress.     Appearance: She is not ill-appearing.   Eyes:      Pupils: Pupils are equal, round, and reactive to light.   Cardiovascular:      Rate and Rhythm: Normal rate and regular rhythm.      Pulses: Normal pulses.      Heart sounds: No murmur heard.  Pulmonary:      Effort: No respiratory distress.      Breath sounds: No wheezing.   Abdominal:      General: Abdomen is flat. Bowel sounds are normal. There is no distension.   Musculoskeletal:      Right lower leg: No edema.      Left lower leg: No edema.   Skin:     General: Skin is warm and dry.   Neurological:      Mental Status: She is alert. Mental status is at baseline.      Cranial Nerves: No cranial nerve deficit.      Motor: Weakness present.   Psychiatric:         Mood and Affect: Mood normal.         Behavior: Behavior normal.         Assessment/Plan  Diagnoses and all orders for this visit:  Hyperlipidemia, unspecified hyperlipidemia type  Primary hypertension  Chronic  obstructive pulmonary disease, unspecified COPD type (Multi)  Other polyneuropathy    Patient seen and examined at bedside.  Medications reviewed and reconciled, continue on current medications\.  Closely monitor respiratory status, no change in medications at this time, discussed care plan with staff and no additional issues noted     Reviewed and approved by PARAG ESCOBAR on 4/10/25 at 8:13 PM.       Electronically Signed By: Parag Escobar DO   4/10/25  8:13 PM

## 2025-04-10 ASSESSMENT — ENCOUNTER SYMPTOMS
RESPIRATORY NEGATIVE: 1
CARDIOVASCULAR NEGATIVE: 1
CONSTITUTIONAL NEGATIVE: 1
NEUROLOGICAL NEGATIVE: 1
MUSCULOSKELETAL NEGATIVE: 1
GASTROINTESTINAL NEGATIVE: 1
PSYCHIATRIC NEGATIVE: 1

## 2025-04-11 NOTE — PROGRESS NOTES
Subjective   Patient ID: Lyn Arellano is a 89 y.o. female.    Patient is a 89-year-old female with past medical history of COPD, neuropathy, emphysema who resides at assisted living facility.  She regards that she is overall doing well without any acute issues, is chronically short of breath, but tries to stay active within the facility.  Otherwise, her breathing status is overall stable, is compliant with her inhalers and neuropathy is overall stable as well.  Otherwise, endorses no acute issues or concerns and overall feels well.         Review of Systems   Constitutional: Negative.    HENT: Negative.     Respiratory: Negative.     Cardiovascular: Negative.    Gastrointestinal: Negative.    Musculoskeletal: Negative.    Neurological: Negative.    Psychiatric/Behavioral: Negative.         Objective Vitals Reviewed via facility EMR    Physical Exam  Constitutional:       General: She is not in acute distress.     Appearance: She is not ill-appearing.   Eyes:      Pupils: Pupils are equal, round, and reactive to light.   Cardiovascular:      Rate and Rhythm: Normal rate and regular rhythm.      Pulses: Normal pulses.      Heart sounds: No murmur heard.  Pulmonary:      Effort: No respiratory distress.      Breath sounds: No wheezing.   Abdominal:      General: Abdomen is flat. Bowel sounds are normal. There is no distension.   Musculoskeletal:      Right lower leg: No edema.      Left lower leg: No edema.   Skin:     General: Skin is warm and dry.   Neurological:      Mental Status: She is alert. Mental status is at baseline.      Cranial Nerves: No cranial nerve deficit.      Motor: Weakness present.   Psychiatric:         Mood and Affect: Mood normal.         Behavior: Behavior normal.         Assessment/Plan   Diagnoses and all orders for this visit:  Hyperlipidemia, unspecified hyperlipidemia type  Primary hypertension  Chronic obstructive pulmonary disease, unspecified COPD type (Multi)  Other  polyneuropathy    Patient seen and examined at bedside.  Medications reviewed and reconciled, continue on current medications\.  Closely monitor respiratory status, no change in medications at this time, discussed care plan with staff and no additional issues noted     Reviewed and approved by SUSAN ESCOBAR on 4/10/25 at 8:13 PM.

## 2025-06-24 ENCOUNTER — NURSING HOME VISIT (OUTPATIENT)
Dept: POST ACUTE CARE | Facility: EXTERNAL LOCATION | Age: OVER 89
End: 2025-06-24
Payer: MEDICARE

## 2025-06-24 DIAGNOSIS — E78.5 HYPERLIPIDEMIA, UNSPECIFIED HYPERLIPIDEMIA TYPE: Primary | ICD-10-CM

## 2025-06-24 DIAGNOSIS — J44.9 CHRONIC OBSTRUCTIVE PULMONARY DISEASE, UNSPECIFIED COPD TYPE (MULTI): ICD-10-CM

## 2025-06-24 DIAGNOSIS — I10 PRIMARY HYPERTENSION: ICD-10-CM

## 2025-06-24 DIAGNOSIS — N18.31 STAGE 3A CHRONIC KIDNEY DISEASE (MULTI): ICD-10-CM

## 2025-06-24 PROCEDURE — 99348 HOME/RES VST EST LOW MDM 30: CPT | Performed by: STUDENT IN AN ORGANIZED HEALTH CARE EDUCATION/TRAINING PROGRAM

## 2025-06-24 NOTE — LETTER
Patient: Lyn Arellano  : 10/22/1925    Encounter Date: 2025    Subjective  Patient ID: Lyn Arellano is a 89 y.o. female.    Patient seen and examined on routine evaluation.  She regards that she is overall doing well without any acute issues or concerns.  Endorses that she does have COPD, is compliant with her breathing treatments, and states that her COPD is overall been stable.  Otherwise regards that she is tolerating her medications, does have a murmur but is asymptomatic from this.  Even if she was, she does endorse that she would not want to do anything about it due to her age.  Otherwise, states no acute issues or concerns and overall feels well.         Review of Systems   Constitutional:  Positive for fatigue.   HENT: Negative.     Respiratory: Negative.     Cardiovascular: Negative.    Gastrointestinal: Negative.    Musculoskeletal: Negative.    Neurological:  Positive for weakness.   Psychiatric/Behavioral: Negative.         ObjectiveVitals Reviewed via facility EMR   Physical Exam  Constitutional:       General: She is not in acute distress.     Appearance: She is not ill-appearing.   Eyes:      Pupils: Pupils are equal, round, and reactive to light.   Cardiovascular:      Rate and Rhythm: Normal rate and regular rhythm.      Pulses: Normal pulses.      Heart sounds: No murmur heard.  Pulmonary:      Effort: No respiratory distress.      Breath sounds: No wheezing.      Comments: Sob, stable  Abdominal:      General: Abdomen is flat. Bowel sounds are normal. There is no distension.   Musculoskeletal:      Right lower leg: No edema.      Left lower leg: No edema.   Skin:     General: Skin is warm and dry.   Neurological:      Mental Status: She is alert. Mental status is at baseline.      Cranial Nerves: No cranial nerve deficit.      Motor: Weakness present.   Psychiatric:         Mood and Affect: Mood normal.         Behavior: Behavior normal.         Assessment/Plan  Diagnoses and  all orders for this visit:  Hyperlipidemia, unspecified hyperlipidemia type  Stage 3a chronic kidney disease (Multi)  Chronic obstructive pulmonary disease, unspecified COPD type (Multi)  Primary hypertension  Patient seen and examined at bedside on routine evaluation.  Continue breathing treatments, will closely monitor respiratory status with COPD.  Otherwise continue supportive care, no change in medications at this time.  Hold off on lab work at this time.  Discussed care plan with staff and no issues noted.     Reviewed and approved by PARAG ESCOBAR on 6/26/25 at 10:25 PM.         Electronically Signed By: Parag Escobar DO   6/26/25 10:25 PM

## 2025-06-26 ASSESSMENT — ENCOUNTER SYMPTOMS
MUSCULOSKELETAL NEGATIVE: 1
FATIGUE: 1
CARDIOVASCULAR NEGATIVE: 1
GASTROINTESTINAL NEGATIVE: 1
RESPIRATORY NEGATIVE: 1
PSYCHIATRIC NEGATIVE: 1
WEAKNESS: 1

## 2025-06-27 NOTE — PROGRESS NOTES
Subjective   Patient ID: Lyn Arellano is a 89 y.o. female.    Patient seen and examined on routine evaluation.  She regards that she is overall doing well without any acute issues or concerns.  Endorses that she does have COPD, is compliant with her breathing treatments, and states that her COPD is overall been stable.  Otherwise regards that she is tolerating her medications, does have a murmur but is asymptomatic from this.  Even if she was, she does endorse that she would not want to do anything about it due to her age.  Otherwise, states no acute issues or concerns and overall feels well.         Review of Systems   Constitutional:  Positive for fatigue.   HENT: Negative.     Respiratory: Negative.     Cardiovascular: Negative.    Gastrointestinal: Negative.    Musculoskeletal: Negative.    Neurological:  Positive for weakness.   Psychiatric/Behavioral: Negative.         Objective Vitals Reviewed via facility EMR   Physical Exam  Constitutional:       General: She is not in acute distress.     Appearance: She is not ill-appearing.   Eyes:      Pupils: Pupils are equal, round, and reactive to light.   Cardiovascular:      Rate and Rhythm: Normal rate and regular rhythm.      Pulses: Normal pulses.      Heart sounds: No murmur heard.  Pulmonary:      Effort: No respiratory distress.      Breath sounds: No wheezing.      Comments: Sob, stable  Abdominal:      General: Abdomen is flat. Bowel sounds are normal. There is no distension.   Musculoskeletal:      Right lower leg: No edema.      Left lower leg: No edema.   Skin:     General: Skin is warm and dry.   Neurological:      Mental Status: She is alert. Mental status is at baseline.      Cranial Nerves: No cranial nerve deficit.      Motor: Weakness present.   Psychiatric:         Mood and Affect: Mood normal.         Behavior: Behavior normal.         Assessment/Plan   Diagnoses and all orders for this visit:  Hyperlipidemia, unspecified hyperlipidemia  type  Stage 3a chronic kidney disease (Multi)  Chronic obstructive pulmonary disease, unspecified COPD type (Multi)  Primary hypertension  Patient seen and examined at bedside on routine evaluation.  Continue breathing treatments, will closely monitor respiratory status with COPD.  Otherwise continue supportive care, no change in medications at this time.  Hold off on lab work at this time.  Discussed care plan with staff and no issues noted.     Reviewed and approved by SUSAN ESCOBAR on 6/26/25 at 10:25 PM.

## 2025-07-15 ENCOUNTER — NURSING HOME VISIT (OUTPATIENT)
Dept: POST ACUTE CARE | Facility: EXTERNAL LOCATION | Age: OVER 89
End: 2025-07-15
Payer: MEDICARE

## 2025-07-15 DIAGNOSIS — R33.9 URINARY RETENTION: ICD-10-CM

## 2025-07-15 DIAGNOSIS — I10 PRIMARY HYPERTENSION: ICD-10-CM

## 2025-07-15 DIAGNOSIS — E78.5 HYPERLIPIDEMIA, UNSPECIFIED HYPERLIPIDEMIA TYPE: Primary | ICD-10-CM

## 2025-07-15 DIAGNOSIS — N18.31 STAGE 3A CHRONIC KIDNEY DISEASE (MULTI): ICD-10-CM

## 2025-07-15 DIAGNOSIS — N30.00 ACUTE CYSTITIS WITHOUT HEMATURIA: ICD-10-CM

## 2025-07-15 PROCEDURE — 99348 HOME/RES VST EST LOW MDM 30: CPT | Performed by: STUDENT IN AN ORGANIZED HEALTH CARE EDUCATION/TRAINING PROGRAM

## 2025-07-15 ASSESSMENT — ENCOUNTER SYMPTOMS
FATIGUE: 1
WEAKNESS: 1
PSYCHIATRIC NEGATIVE: 1
GASTROINTESTINAL NEGATIVE: 1
RESPIRATORY NEGATIVE: 1
CARDIOVASCULAR NEGATIVE: 1
MUSCULOSKELETAL NEGATIVE: 1

## 2025-07-15 NOTE — LETTER
Patient: Lyn Arellano  : 10/22/1925    Encounter Date: 07/15/2025    Subjective  Patient ID: Lyn Arellano is a 89 y.o. female.    Patient seen in routine follow-up, as well as sick visit.  Recently, family and facility has started noticing the patient was slightly more confused and altered from baseline recently.  Due to this, urine test was subsequently done which was overall normal.  On evaluation, patient feels like she is overall at her baseline.  She endorses no acute issues or concerns breathing status is overall at baseline as well.  Otherwise overall feels well.         Review of Systems   Constitutional:  Positive for fatigue.   HENT: Negative.     Respiratory: Negative.     Cardiovascular: Negative.    Gastrointestinal: Negative.    Musculoskeletal: Negative.    Neurological:  Positive for weakness.   Psychiatric/Behavioral: Negative.         ObjectiveVitals Reviewed via facility EMR   Physical Exam  Constitutional:       General: She is not in acute distress.     Appearance: She is not ill-appearing.   Eyes:      Pupils: Pupils are equal, round, and reactive to light.   Cardiovascular:      Rate and Rhythm: Normal rate and regular rhythm.      Pulses: Normal pulses.      Heart sounds: No murmur heard.  Pulmonary:      Effort: No respiratory distress.      Breath sounds: No wheezing.   Abdominal:      General: Abdomen is flat. Bowel sounds are normal. There is no distension.   Musculoskeletal:      Right lower leg: No edema.      Left lower leg: No edema.   Skin:     General: Skin is warm and dry.   Neurological:      Mental Status: She is alert. Mental status is at baseline.      Cranial Nerves: No cranial nerve deficit.      Motor: Weakness present.   Psychiatric:         Mood and Affect: Mood normal.         Behavior: Behavior normal.         Assessment/Plan  Diagnoses and all orders for this visit:  Hyperlipidemia, unspecified hyperlipidemia type  Primary hypertension  Stage 3a chronic  kidney disease (Multi)  Urinary retention  Acute cystitis without hematuria      Patient seen and examined at bedside.  Overall medically stable, I do suspect that the current episode or recent episodes could be underlying related to her underlying advanced age.  No signs symptoms of UTI at this time but will closely monitor encouraged hydration as well as activities within the facility.  Otherwise, no additional issues or concerns at this time.        Reviewed and approved by PARAG ESCOBAR on 7/15/25 at 10:50 PM.     Electronically Signed By: Parag Escobar DO   7/15/25 10:50 PM

## 2025-07-16 NOTE — PROGRESS NOTES
Subjective   Patient ID: Lyn Arellano is a 89 y.o. female.    Patient seen in routine follow-up, as well as sick visit.  Recently, family and facility has started noticing the patient was slightly more confused and altered from baseline recently.  Due to this, urine test was subsequently done which was overall normal.  On evaluation, patient feels like she is overall at her baseline.  She endorses no acute issues or concerns breathing status is overall at baseline as well.  Otherwise overall feels well.         Review of Systems   Constitutional:  Positive for fatigue.   HENT: Negative.     Respiratory: Negative.     Cardiovascular: Negative.    Gastrointestinal: Negative.    Musculoskeletal: Negative.    Neurological:  Positive for weakness.   Psychiatric/Behavioral: Negative.         Objective Vitals Reviewed via facility EMR   Physical Exam  Constitutional:       General: She is not in acute distress.     Appearance: She is not ill-appearing.   Eyes:      Pupils: Pupils are equal, round, and reactive to light.   Cardiovascular:      Rate and Rhythm: Normal rate and regular rhythm.      Pulses: Normal pulses.      Heart sounds: No murmur heard.  Pulmonary:      Effort: No respiratory distress.      Breath sounds: No wheezing.   Abdominal:      General: Abdomen is flat. Bowel sounds are normal. There is no distension.   Musculoskeletal:      Right lower leg: No edema.      Left lower leg: No edema.   Skin:     General: Skin is warm and dry.   Neurological:      Mental Status: She is alert. Mental status is at baseline.      Cranial Nerves: No cranial nerve deficit.      Motor: Weakness present.   Psychiatric:         Mood and Affect: Mood normal.         Behavior: Behavior normal.         Assessment/Plan   Diagnoses and all orders for this visit:  Hyperlipidemia, unspecified hyperlipidemia type  Primary hypertension  Stage 3a chronic kidney disease (Multi)  Urinary retention  Acute cystitis without  hematuria      Patient seen and examined at bedside.  Overall medically stable, I do suspect that the current episode or recent episodes could be underlying related to her underlying advanced age.  No signs symptoms of UTI at this time but will closely monitor encouraged hydration as well as activities within the facility.  Otherwise, no additional issues or concerns at this time.        Reviewed and approved by SUSAN ESCOBAR on 7/15/25 at 10:50 PM.

## 2025-08-19 ENCOUNTER — NURSING HOME VISIT (OUTPATIENT)
Dept: POST ACUTE CARE | Facility: EXTERNAL LOCATION | Age: OVER 89
End: 2025-08-19
Payer: MEDICARE

## 2025-08-19 DIAGNOSIS — N18.31 STAGE 3A CHRONIC KIDNEY DISEASE (MULTI): ICD-10-CM

## 2025-08-19 DIAGNOSIS — I10 PRIMARY HYPERTENSION: Primary | ICD-10-CM

## 2025-08-19 DIAGNOSIS — J44.9 CHRONIC OBSTRUCTIVE PULMONARY DISEASE, UNSPECIFIED COPD TYPE (MULTI): ICD-10-CM

## 2025-08-19 DIAGNOSIS — J18.9 PNEUMONIA OF RIGHT LOWER LOBE DUE TO INFECTIOUS ORGANISM: ICD-10-CM

## 2025-08-19 PROCEDURE — 99349 HOME/RES VST EST MOD MDM 40: CPT | Performed by: STUDENT IN AN ORGANIZED HEALTH CARE EDUCATION/TRAINING PROGRAM

## 2025-08-19 ASSESSMENT — ENCOUNTER SYMPTOMS
CARDIOVASCULAR NEGATIVE: 1
PSYCHIATRIC NEGATIVE: 1
CONSTITUTIONAL NEGATIVE: 1
MUSCULOSKELETAL NEGATIVE: 1
SHORTNESS OF BREATH: 1
GASTROINTESTINAL NEGATIVE: 1
NEUROLOGICAL NEGATIVE: 1
COUGH: 1

## 2025-08-26 ENCOUNTER — NURSING HOME VISIT (OUTPATIENT)
Dept: POST ACUTE CARE | Facility: EXTERNAL LOCATION | Age: OVER 89
End: 2025-08-26
Payer: MEDICARE

## 2025-08-26 DIAGNOSIS — N18.31 STAGE 3A CHRONIC KIDNEY DISEASE (MULTI): ICD-10-CM

## 2025-08-26 DIAGNOSIS — Z71.89 GOALS OF CARE, COUNSELING/DISCUSSION: ICD-10-CM

## 2025-08-26 DIAGNOSIS — I10 PRIMARY HYPERTENSION: Primary | ICD-10-CM

## 2025-08-26 DIAGNOSIS — J18.9 PNEUMONIA OF RIGHT LOWER LOBE DUE TO INFECTIOUS ORGANISM: ICD-10-CM

## 2025-08-26 PROBLEM — F41.9 ANXIETY: Status: ACTIVE | Noted: 2025-08-26

## 2025-08-26 PROCEDURE — 99349 HOME/RES VST EST MOD MDM 40: CPT | Performed by: STUDENT IN AN ORGANIZED HEALTH CARE EDUCATION/TRAINING PROGRAM

## 2025-08-26 ASSESSMENT — ENCOUNTER SYMPTOMS
RESPIRATORY NEGATIVE: 1
FATIGUE: 1
CARDIOVASCULAR NEGATIVE: 1
MUSCULOSKELETAL NEGATIVE: 1
GASTROINTESTINAL NEGATIVE: 1
PSYCHIATRIC NEGATIVE: 1
WEAKNESS: 1